# Patient Record
Sex: FEMALE | Race: WHITE | NOT HISPANIC OR LATINO | Employment: FULL TIME | URBAN - METROPOLITAN AREA
[De-identification: names, ages, dates, MRNs, and addresses within clinical notes are randomized per-mention and may not be internally consistent; named-entity substitution may affect disease eponyms.]

---

## 2017-08-10 ENCOUNTER — TRANSCRIBE ORDERS (OUTPATIENT)
Dept: ADMINISTRATIVE | Facility: HOSPITAL | Age: 27
End: 2017-08-10

## 2017-08-10 ENCOUNTER — APPOINTMENT (OUTPATIENT)
Dept: RADIOLOGY | Facility: CLINIC | Age: 27
End: 2017-08-10
Payer: COMMERCIAL

## 2017-08-10 DIAGNOSIS — R30.0 DYSURIA: Primary | ICD-10-CM

## 2017-08-10 PROCEDURE — 74000 HB X-RAY EXAM OF ABDOMEN (SINGLE ANTEROPOSTERIOR VIEW): CPT

## 2017-08-17 ENCOUNTER — TRANSCRIBE ORDERS (OUTPATIENT)
Dept: ADMINISTRATIVE | Facility: HOSPITAL | Age: 27
End: 2017-08-17

## 2017-08-17 DIAGNOSIS — N20.0 KIDNEY STONE: Primary | ICD-10-CM

## 2021-12-29 RX ORDER — MELATONIN
1000 DAILY
COMMUNITY

## 2022-01-03 ENCOUNTER — ROUTINE PRENATAL (OUTPATIENT)
Dept: PERINATAL CARE | Facility: OTHER | Age: 32
End: 2022-01-03
Payer: COMMERCIAL

## 2022-01-03 VITALS
DIASTOLIC BLOOD PRESSURE: 62 MMHG | SYSTOLIC BLOOD PRESSURE: 118 MMHG | HEIGHT: 63 IN | BODY MASS INDEX: 26.75 KG/M2 | HEART RATE: 105 BPM | WEIGHT: 151 LBS

## 2022-01-03 DIAGNOSIS — Z3A.13 13 WEEKS GESTATION OF PREGNANCY: ICD-10-CM

## 2022-01-03 DIAGNOSIS — Z36.82 ENCOUNTER FOR ANTENATAL SCREENING FOR NUCHAL TRANSLUCENCY: Primary | ICD-10-CM

## 2022-01-03 DIAGNOSIS — O10.911 MATERNAL CHRONIC HYPERTENSION, FIRST TRIMESTER: ICD-10-CM

## 2022-01-03 PROCEDURE — 99212 OFFICE O/P EST SF 10 MIN: CPT | Performed by: OBSTETRICS & GYNECOLOGY

## 2022-01-03 PROCEDURE — 76813 OB US NUCHAL MEAS 1 GEST: CPT | Performed by: OBSTETRICS & GYNECOLOGY

## 2022-01-03 RX ORDER — MULTIVIT WITH MINERALS/LUTEIN
1000 TABLET ORAL DAILY
COMMUNITY

## 2022-01-03 RX ORDER — LANOLIN ALCOHOL/MO/W.PET/CERES
50 CREAM (GRAM) TOPICAL DAILY
COMMUNITY
End: 2022-02-28

## 2022-01-04 RX ORDER — ASPIRIN 81 MG/1
162 TABLET, CHEWABLE ORAL DAILY
Qty: 180 TABLET | Refills: 1 | Status: SHIPPED | OUTPATIENT
Start: 2022-01-04 | End: 2022-02-28

## 2022-01-18 ENCOUNTER — TELEPHONE (OUTPATIENT)
Dept: PERINATAL CARE | Facility: CLINIC | Age: 32
End: 2022-01-18

## 2022-01-18 NOTE — TELEPHONE ENCOUNTER
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

## 2022-01-27 ENCOUNTER — TELEPHONE (OUTPATIENT)
Dept: PERINATAL CARE | Facility: OTHER | Age: 32
End: 2022-01-27

## 2022-01-27 NOTE — TELEPHONE ENCOUNTER
Patient called office with concerns over aspirin therapy  She would like to ask Dr Anna Barboza if it would be possible to have her prescription changed to one aspirin daily instead of two  She states she is "nervous to take 2 Aspirin a day"

## 2022-01-27 NOTE — TELEPHONE ENCOUNTER
Called patient and informed her per Dr Nita Phillips, she can take one Aspirin a day  She offers no further questions or concerns at this time

## 2022-02-28 ENCOUNTER — ROUTINE PRENATAL (OUTPATIENT)
Dept: PERINATAL CARE | Facility: OTHER | Age: 32
End: 2022-02-28
Payer: COMMERCIAL

## 2022-02-28 VITALS
WEIGHT: 163.8 LBS | DIASTOLIC BLOOD PRESSURE: 84 MMHG | SYSTOLIC BLOOD PRESSURE: 136 MMHG | BODY MASS INDEX: 29.02 KG/M2 | HEART RATE: 96 BPM | HEIGHT: 63 IN

## 2022-02-28 DIAGNOSIS — Z36.3 ENCOUNTER FOR ANTENATAL SCREENING FOR MALFORMATION: Primary | ICD-10-CM

## 2022-02-28 DIAGNOSIS — Z36.86 ENCOUNTER FOR ANTENATAL SCREENING FOR CERVICAL LENGTH: ICD-10-CM

## 2022-02-28 DIAGNOSIS — Z3A.21 21 WEEKS GESTATION OF PREGNANCY: ICD-10-CM

## 2022-02-28 PROCEDURE — 76805 OB US >/= 14 WKS SNGL FETUS: CPT | Performed by: OBSTETRICS & GYNECOLOGY

## 2022-02-28 PROCEDURE — 76817 TRANSVAGINAL US OBSTETRIC: CPT | Performed by: OBSTETRICS & GYNECOLOGY

## 2022-02-28 PROCEDURE — 99212 OFFICE O/P EST SF 10 MIN: CPT | Performed by: OBSTETRICS & GYNECOLOGY

## 2022-02-28 RX ORDER — ASPIRIN 81 MG/1
81 TABLET, CHEWABLE ORAL DAILY
Qty: 30 TABLET
Start: 2022-02-28 | End: 2022-05-29

## 2022-02-28 NOTE — PROGRESS NOTES
Earnestine Cantu  has no complaints today at 21w6d  She reports fetal movements and does not report any vaginal bleeding or signs of labor  Her recently completed fetal testing revealed a normal NIPT  Ultrasound findings: The ultrasound today shows normal interval fetal growth and fluid, normal cervical length, and no malformations were detected  Pregnancy ultrasound has limitations and is unable to detect all forms of fetal congenital abnormalities  Counseling:  Omari Jones reports that she thinks she has white coat hypertension and is worried that her blood pressures are elevated higher here than they are at home     She has her own blood pressure cuff that she correlated with her family practitioner  Her blood pressures at home were 107/75 75 and 115/78  She was started on baby aspirin 162 milligrams at her sequential screen  She is uncomfortable with using medication in pregnancy so she states she spoke with Dr Jose Bowens and he told her she could take 1 tablet daily  At this time she was taking 1 tablet every other day which I encouraged her to resume at least 1 daily  I reassured her of the safety studies on baby aspirin which has been around for many years  Lastly she has been noticing it is very difficult for her to sleep  When she was using Unisom as part of a treatment for hyperemesis she noticed it helped with her sleep  I reassured her that Seema Ache is a sleeping agent primarily and that she could use this in pregnancy  Follow up recommended:   1  No further follow-up ultrasounds are recommended at this time  Pre visit time reviewing her records   5 minutes  Face to face time 5 minutes  Post visit time on documentation of note, updating her problem list, adding orders and prescriptions 5 minutes  Procedures that were completed today were charged separately  The level of decision making was straight forward      Marisa Galvin MD

## 2022-02-28 NOTE — PROGRESS NOTES
Ultrasound Probe Disinfection    A transvaginal ultrasound was performed  Prior to use, disinfection was performed with High Level Disinfection Process (Trophon)  Probe serial number [id number] was used        Yovany Cheek  02/28/22  3:57 PM

## 2022-02-28 NOTE — LETTER
February 28, 2022     RITIKA Jay  [address]    Patient: Minh Boyd   YOB: 1990   Date of Visit: 2/28/2022       Dear Dr Huerta Ace: Thank you for referring Minh Boyd to me for evaluation  Below are my notes for this consultation  If you have questions, please do not hesitate to call me  I look forward to following your patient along with you  Sincerely,        Farhana Collins MD        CC: No Recipients  Farhana Collins MD  2/28/2022  5:39 PM  Sign when Signing Visit  Minh Boyd  has no complaints today at [address] [place] [address]  She reports fetal movements and does not report any vaginal bleeding or signs of labor  Her recently completed fetal testing revealed a normal NIPT  Ultrasound findings: The ultrasound today shows normal interval fetal growth and fluid, normal cervical length, and no malformations were detected  Pregnancy ultrasound has limitations and is unable to detect all forms of fetal congenital abnormalities  Counseling:  Delia Galloway reports that she thinks she has white coat hypertension and is worried that her blood pressures are elevated higher here than they are at home     She has her own blood pressure cuff that she correlated with her family practitioner  Her blood pressures at home were 107/75 75 and 115/78  She was started on baby aspirin 162 milligrams at her sequential screen  She is uncomfortable with using medication in pregnancy so she states she spoke with Dr Meri Sprague and he told her she could take 1 tablet daily  At this time she was taking 1 tablet every other day which I encouraged her to resume at least 1 daily  I reassured her of the safety studies on baby aspirin which has been around for many years  Lastly she has been noticing it is very difficult for her to sleep    When she was using Unisom as part of a treatment for hyperemesis she noticed it helped with her sleep   I reassured her that Esme Adamesine is a sleeping agent primarily and that she could use this in pregnancy  Follow up recommended:   1  No further follow-up ultrasounds are recommended at this time  Pre visit time reviewing her records   5 minutes  Face to face time 5 minutes  Post visit time on documentation of note, updating her problem list, adding orders and prescriptions 5 minutes  Procedures that were completed today were charged separately  The level of decision making was straight forward      Matt Frost MD

## 2024-04-27 LAB
EXTERNAL HIV SCREEN: NORMAL
HBA1C MFR BLD HPLC: 5.1 %
HCV AB SER-ACNC: NONREACTIVE

## 2024-05-03 ENCOUNTER — TRANSCRIBE ORDERS (OUTPATIENT)
Facility: HOSPITAL | Age: 34
End: 2024-05-03

## 2024-05-03 ENCOUNTER — TELEPHONE (OUTPATIENT)
Facility: HOSPITAL | Age: 34
End: 2024-05-03

## 2024-05-03 DIAGNOSIS — O09.899 SUPERVISION OF OTHER HIGH RISK PREGNANCY, ANTEPARTUM: Primary | ICD-10-CM

## 2024-05-03 NOTE — TELEPHONE ENCOUNTER
Called patient to schedule MFM appointment, based on referral issued to Maternal Fetal Medicine by OB office.      Left voicemail requesting patient to call back and schedule appointment, with office number for return call [phone].

## 2024-05-17 ENCOUNTER — ROUTINE PRENATAL (OUTPATIENT)
Dept: PERINATAL CARE | Facility: OTHER | Age: 34
End: 2024-05-17
Payer: COMMERCIAL

## 2024-05-17 VITALS
HEART RATE: 90 BPM | SYSTOLIC BLOOD PRESSURE: 116 MMHG | WEIGHT: 167.8 LBS | DIASTOLIC BLOOD PRESSURE: 68 MMHG | BODY MASS INDEX: 29.73 KG/M2 | OXYGEN SATURATION: 99 % | HEIGHT: 63 IN

## 2024-05-17 DIAGNOSIS — Z3A.17 17 WEEKS GESTATION OF PREGNANCY: ICD-10-CM

## 2024-05-17 DIAGNOSIS — Z36.3 ENCOUNTER FOR ANTENATAL SCREENING FOR MALFORMATIONS: ICD-10-CM

## 2024-05-17 DIAGNOSIS — O09.899 SUPERVISION OF OTHER HIGH RISK PREGNANCY, ANTEPARTUM: ICD-10-CM

## 2024-05-17 DIAGNOSIS — O36.80X0 ENCOUNTER TO DETERMINE FETAL VIABILITY OF PREGNANCY, SINGLE OR UNSPECIFIED FETUS: Primary | ICD-10-CM

## 2024-05-17 PROCEDURE — 99243 OFF/OP CNSLTJ NEW/EST LOW 30: CPT | Performed by: OBSTETRICS & GYNECOLOGY

## 2024-05-17 PROCEDURE — 76811 OB US DETAILED SNGL FETUS: CPT | Performed by: OBSTETRICS & GYNECOLOGY

## 2024-05-17 NOTE — LETTER
2024    [facility]  [address]  [place] [address]    Patient: Inessa Juarez   YOB: 1990   Date of Visit: 2024   Nature of this communication: Routine     This patient was seen recently in our  office.  Consultation is contained in body of ultrasound report which has been faxed to you under separate cover; please contact us if you do not receive this.    Please don't hesitate to contact our office with any concerns or questions.     Sincerely,      Stephanie Segura MD  Attending Physician, Maternal-Fetal Medicine  [facility]

## 2024-05-17 NOTE — PROGRESS NOTES
"[facility]: Ms. Juarez was seen today for anatomic survey ultrasound.  See ultrasound report under \"OB Procedures\" tab.      MDM:   I. Diagnoses/Problems addressed:  Self limited or minor problem: uncomplicated pregnancy  II.  Data: I reviewed [facility] lab tests ordered by another provider.  III.  Risk of morbidity: Low    Please don't hesitate to contact our office with any concerns or questions.  -Stephanie Segura MD    "

## 2024-06-18 PROBLEM — O09.522 MULTIGRAVIDA OF ADVANCED MATERNAL AGE IN SECOND TRIMESTER: Status: ACTIVE | Noted: 2024-06-18

## 2024-06-18 PROBLEM — Z80.3 FAMILY HISTORY OF MALIGNANT NEOPLASM OF FEMALE BREAST: Status: RESOLVED | Noted: 2024-06-18 | Resolved: 2024-06-18

## 2024-06-19 ENCOUNTER — ULTRASOUND (OUTPATIENT)
Dept: PERINATAL CARE | Facility: OTHER | Age: 34
End: 2024-06-19
Payer: COMMERCIAL

## 2024-06-19 VITALS
HEIGHT: 63 IN | DIASTOLIC BLOOD PRESSURE: 84 MMHG | BODY MASS INDEX: 30.19 KG/M2 | WEIGHT: 170.4 LBS | HEART RATE: 103 BPM | SYSTOLIC BLOOD PRESSURE: 126 MMHG

## 2024-06-19 DIAGNOSIS — Z36.2 ENCOUNTER FOR FOLLOW-UP ULTRASOUND OF FETAL ANATOMY: ICD-10-CM

## 2024-06-19 DIAGNOSIS — O09.522 MULTIGRAVIDA OF ADVANCED MATERNAL AGE IN SECOND TRIMESTER: Primary | ICD-10-CM

## 2024-06-19 DIAGNOSIS — Z36.86 ENCOUNTER FOR ANTENATAL SCREENING FOR CERVICAL LENGTH: ICD-10-CM

## 2024-06-19 DIAGNOSIS — Z3A.22 22 WEEKS GESTATION OF PREGNANCY: ICD-10-CM

## 2024-06-19 PROCEDURE — 76817 TRANSVAGINAL US OBSTETRIC: CPT | Performed by: OBSTETRICS & GYNECOLOGY

## 2024-06-19 PROCEDURE — 76816 OB US FOLLOW-UP PER FETUS: CPT | Performed by: OBSTETRICS & GYNECOLOGY

## 2024-06-19 PROCEDURE — 99213 OFFICE O/P EST LOW 20 MIN: CPT | Performed by: NURSE PRACTITIONER

## 2024-06-19 NOTE — PROGRESS NOTES
Inessa Amaya has no complaints today.  She reports regular fetal movements and does not report any problems.  She is here today at 22w1d for evaluation of fetal weight and amniotic fluid, follow up missed anatomy from the fetal anatomic survey and transvaginal ultrasound.         Problem list:  1.  Advanced maternal age     Ultrasound findings:  A viable feng intrauterine pregnancy is seen. Estimated fetal weight and amniotic fluid are within normal limits for gestational age.  Growth is at the 77th percentile.  No fetal anomalies are visualized on limited survey. Placenta is within normal limits. Transvaginal cervical length is within normal limits (4.15 cm), indicating low risk for  birth. Uterus and adnexa appear within normal limits. The ultrasound today reviewed most of the prior missed anatomy which is seen today and appears normal, with the exception of LVOT which is limited by fetal position.       Pregnancy ultrasound has limitations and is unable to detect all forms of fetal congenital abnormalities.  The inaccuracy in the EFW can be off by 1 lb either way in the third trimester.     Specific counseling was provided on the following problems:  1.  She has an order for an early 1 hour Glucola screening due to advanced maternal age.  Given that she is now 22 weeks of pregnancy, she was advised to wait until 24 weeks to complete diabetic screening.     Follow up recommended:   1.  Growth scan at 32 weeks.     Split-shared decision-making between RITIKA Smith and myself was utilized, with the majority of the time spent by NAZARIO Smith  Medical decision-making for this encounter was low (diagnosis low, data limited and risk low).    Procedures that were completed today were charged separately.     I reviewed the ultrasound pictures and recommended the medical decision making transcribed in the care of this patient.      Lisseth Ying M.D.

## 2024-06-19 NOTE — LETTER
2024     Huong Ansari, RITIKA  [address] [address]    Patient: Inessa Juarez   YOB: 1990   Date of Visit: 2024       Dear Dr. Ansari:    Thank you for referring Inessa Juarez to me for evaluation. Below are my notes for this consultation.    If you have questions, please do not hesitate to call me. I look forward to following your patient along with you.         Sincerely,        Lisseth Ying MD        CC: No Recipients    Lisseth Ying MD  2024  3:40 PM  Sign when Signing Visit  Inessa Amaya has no complaints today.  She reports regular fetal movements and does not report any problems.  She is here today at 22w1d for evaluation of fetal weight and amniotic fluid, follow up missed anatomy from the fetal anatomic survey and transvaginal ultrasound.         Problem list:  1.  Advanced maternal age     Ultrasound findings:  A viable feng intrauterine pregnancy is seen. Estimated fetal weight and amniotic fluid are within normal limits for gestational age.  Growth is at the 77th percentile.  No fetal anomalies are visualized on limited survey. Placenta is within normal limits. Transvaginal cervical length is within normal limits (4.15 cm), indicating low risk for  birth. Uterus and adnexa appear within normal limits. The ultrasound today reviewed most of the prior missed anatomy which is seen today and appears normal, with the exception of LVOT which is limited by fetal position.       Pregnancy ultrasound has limitations and is unable to detect all forms of fetal congenital abnormalities.  The inaccuracy in the EFW can be off by 1 lb either way in the third trimester.     Specific counseling was provided on the following problems:  1.  She has an order for an early 1 hour Glucola screening due to advanced maternal age.  Given that she is now 22 weeks of pregnancy, she was advised to wait  until 24 weeks to complete diabetic screening.     Follow up recommended:   1.  Growth scan at 32 weeks.     Split-shared decision-making between RITIKA Smith and myself was utilized, with the majority of the time spent by NAZARIO Smith  Medical decision-making for this encounter was low (diagnosis low, data limited and risk low).    Procedures that were completed today were charged separately.     I reviewed the ultrasound pictures and recommended the medical decision making transcribed in the care of this patient.      Lisseth Ying M.D.

## 2024-07-03 ENCOUNTER — TELEPHONE (OUTPATIENT)
Age: 34
End: 2024-07-03

## 2024-07-03 NOTE — TELEPHONE ENCOUNTER
Pt called in and stated that MFM provider advised her to go for her glucose test at 24 weeks, per her OB she was advised to do it at 28 weeks. Pt is asking if she could just do 1 glucose test at 28 weeks, based on what she was being advised of. Please follow up with pt.

## 2024-08-30 ENCOUNTER — ULTRASOUND (OUTPATIENT)
Dept: PERINATAL CARE | Facility: OTHER | Age: 34
End: 2024-08-30
Payer: COMMERCIAL

## 2024-08-30 VITALS
HEIGHT: 63 IN | SYSTOLIC BLOOD PRESSURE: 114 MMHG | HEART RATE: 104 BPM | DIASTOLIC BLOOD PRESSURE: 70 MMHG | WEIGHT: 177.8 LBS | BODY MASS INDEX: 31.5 KG/M2

## 2024-08-30 DIAGNOSIS — Z3A.32 32 WEEKS GESTATION OF PREGNANCY: Primary | ICD-10-CM

## 2024-08-30 DIAGNOSIS — O09.523 ELDERLY MULTIGRAVIDA, THIRD TRIMESTER: ICD-10-CM

## 2024-08-30 PROCEDURE — 76816 OB US FOLLOW-UP PER FETUS: CPT | Performed by: OBSTETRICS & GYNECOLOGY

## 2024-08-30 PROCEDURE — 99213 OFFICE O/P EST LOW 20 MIN: CPT | Performed by: OBSTETRICS & GYNECOLOGY

## 2024-08-30 NOTE — PATIENT INSTRUCTIONS
"Patient Education     Your baby's movement before birth   The Basics   Written by the doctors and editors at [facility]   When should I start feeling my baby move? -- It depends. Most people first feel their baby moving in the uterus between about 16 and 20 weeks of pregnancy. It might take longer to feel movement if this is your first pregnancy or if the placenta is in the front of your uterus.  What kinds of movements should I feel? -- When you first feel your baby move, it might feel like a gentle flutter in your belly. This is sometimes called \"quickening.\" As the baby grows, their movements will get stronger. You will probably feel them kicking, rolling, and stretching. Later in pregnancy, you might be able to see and feel the baby moving from the outside.  You might notice that your baby is more active at certain times of the day or night. Even before birth, babies have periods of being asleep and awake. When your baby is sleeping, you might notice that they do not move as much.  Should I keep track of my baby's movements? -- If your pregnancy is healthy, you probably do not need to count or record your baby's movements. Feeling regular movement is a good sign that the baby is doing well.  In some cases, your doctor or midwife might ask you to keep track of your baby's movements. If so, they will tell you how to do this and when to call them.  A change in your baby's movements does not always mean that there is a problem. But in some cases, it can be a sign that the baby is having trouble. If your doctor or midwife is concerned, they can do tests to check on the baby.  If I am asked to track movement, how should I do it? -- There are different ways of tracking your baby's movement. This is sometimes called \"kick counting.\"  Your doctor or midwife will tell you exactly what to track. For example, they might ask you to write down:   How long it takes to feel 10 kicks or movements   How many times your baby moves " in 1 hour  Many experts consider at least 10 movements in 2 hours to be a sign that the baby is doing well. But there is no specific cutoff for exactly how much movement is healthy or unhealthy. Some babies are more active than others, and some pregnant people feel movement more easily than others. The main goal of kick counting is to get to know your baby's normal patterns so you can tell if anything changes.  If you are doing kick counting:   Choose a time of day when your baby is usually active.   Find a quiet place where you will not be distracted.   Lie down on your side in a comfortable position.   Check the clock, or set a timer.   Each time you feel your baby move or kick, write down the time. Some people use a smartphone ariel to keep track.   If your baby seems less active than usual, try moving around, eating a snack, and emptying your bladder. This can help wake the baby up if they are asleep.   Stop counting after you have felt 10 kicks, or after the length of time your doctor or midwife told you.  When should I call the doctor? -- Call your doctor or midwife for advice if:   You have concerns about your baby's movement.   Your baby is moving less than they normally do.   You notice a sudden change in the pattern of your baby's movements.   You have any other symptoms that worry you.  All topics are updated as new evidence becomes available and our peer review process is complete.  This topic retrieved from [employer] on: Feb 26, 2024.  Topic 417874 Version 1.0  Release: 32.2.4 - C32.56  © 2024 UpToDate, Inc. and/or its affiliates. All rights reserved.  Consumer Information Use and Disclaimer   Disclaimer: This generalized information is a limited summary of diagnosis, treatment, and/or medication information. It is not meant to be comprehensive and should be used as a tool to help the user understand and/or assess potential diagnostic and treatment options. It does NOT include all information about  conditions, treatments, medications, side effects, or risks that may apply to a specific patient. It is not intended to be medical advice or a substitute for the medical advice, diagnosis, or treatment of a health care provider based on the health care provider's examination and assessment of a patient's specific and unique circumstances. Patients must speak with a health care provider for complete information about their health, medical questions, and treatment options, including any risks or benefits regarding use of medications. This information does not endorse any treatments or medications as safe, effective, or approved for treating a specific patient. UpToDate, Inc. and its affiliates disclaim any warranty or liability relating to this information or the use thereof.The use of this information is governed by the Terms of Use, available at https://www.woltersNovariantuwer.com/en/know/clinical-effectiveness-terms. 2024© UpToDate, Inc. and its affiliates and/or licensors. All rights reserved.  Copyright   © 2024 UpToDate, Inc. and/or its affiliates. All rights reserved.

## 2024-08-30 NOTE — LETTER
August 30, 2024     Inessa Gonzales,   [address]  [address]  [facility] [address]    Patient: Inessa Juarez   YOB: 1990   Date of Visit: 8/30/2024       Dear Dr. Gonzales:    Thank you for referring Inessa Juarez to me for evaluation. Below are my notes for this consultation.    If you have questions, please do not hesitate to call me. I look forward to following your patient along with you.         Sincerely,        Roel Jimenez MD        CC: No Recipients    Roel Jimenez MD  8/30/2024  2:09 PM  Sign when Signing Visit  Please refer to the [facility] ultrasound report in Ob Procedures for additional information regarding today's visit